# Patient Record
Sex: MALE | Race: WHITE | URBAN - METROPOLITAN AREA
[De-identification: names, ages, dates, MRNs, and addresses within clinical notes are randomized per-mention and may not be internally consistent; named-entity substitution may affect disease eponyms.]

---

## 2018-06-20 ENCOUNTER — OFFICE VISIT (OUTPATIENT)
Dept: ENT CLINIC | Age: 73
End: 2018-06-20

## 2018-06-20 VITALS — WEIGHT: 179 LBS | DIASTOLIC BLOOD PRESSURE: 65 MMHG | SYSTOLIC BLOOD PRESSURE: 146 MMHG | HEART RATE: 81 BPM

## 2018-06-20 DIAGNOSIS — H91.93 BILATERAL HEARING LOSS, UNSPECIFIED HEARING LOSS TYPE: Primary | ICD-10-CM

## 2018-06-20 PROCEDURE — 69210 REMOVE IMPACTED EAR WAX UNI: CPT | Performed by: OTOLARYNGOLOGY

## 2018-06-20 PROCEDURE — 99203 OFFICE O/P NEW LOW 30 MIN: CPT | Performed by: OTOLARYNGOLOGY

## 2018-06-20 RX ORDER — TAMSULOSIN HYDROCHLORIDE 0.4 MG/1
CAPSULE ORAL
COMMUNITY
Start: 2018-03-31 | End: 2018-08-06 | Stop reason: SDUPTHER

## 2018-06-20 RX ORDER — PRAVASTATIN SODIUM 40 MG
TABLET ORAL
COMMUNITY
Start: 2018-05-23 | End: 2018-08-06 | Stop reason: SDUPTHER

## 2018-06-20 RX ORDER — LISINOPRIL 10 MG/1
TABLET ORAL
COMMUNITY
Start: 2018-05-26 | End: 2018-08-06 | Stop reason: SDUPTHER

## 2018-08-06 ENCOUNTER — OFFICE VISIT (OUTPATIENT)
Dept: ENT CLINIC | Age: 73
End: 2018-08-06

## 2018-08-06 VITALS
WEIGHT: 171 LBS | SYSTOLIC BLOOD PRESSURE: 118 MMHG | BODY MASS INDEX: 27.48 KG/M2 | HEART RATE: 69 BPM | HEIGHT: 66 IN | DIASTOLIC BLOOD PRESSURE: 69 MMHG

## 2018-08-06 DIAGNOSIS — H90.3 BILATERAL HIGH FREQUENCY SENSORINEURAL HEARING LOSS: Primary | ICD-10-CM

## 2018-08-06 PROCEDURE — 99214 OFFICE O/P EST MOD 30 MIN: CPT | Performed by: OTOLARYNGOLOGY

## 2018-08-06 RX ORDER — LORATADINE AND PSEUDOEPHEDRINE 10; 240 MG/1; MG/1
1 TABLET, EXTENDED RELEASE ORAL
COMMUNITY
Start: 2018-06-12

## 2018-08-06 RX ORDER — TAMSULOSIN HYDROCHLORIDE 0.4 MG/1
0.4 CAPSULE ORAL
COMMUNITY
Start: 2017-11-27

## 2018-08-06 RX ORDER — LISINOPRIL 10 MG/1
10 TABLET ORAL
COMMUNITY
Start: 2017-11-27

## 2018-08-06 RX ORDER — PRAVASTATIN SODIUM 40 MG
TABLET ORAL
COMMUNITY
Start: 2018-05-22

## 2018-08-06 NOTE — PROGRESS NOTES
[address] [place] [address] ENT       CHIEF COMPLAINT:  See chief complaint/reason for visit. INTERVAL HISTORY:        No new problems or data. Patient states she has a particular difficulty understanding his wife and other female voices. He denied tinnitus and dizziness. AUDIOGRAM (7/10/2018): Bilateral mild to moderate to severe symmetrical, high frequency sensorineural hearing loss in a noise induced hearing loss pattern. Speech reception threshold and word recognition scores were normal bilaterally. There was reduced word recognition to 40% in the presence of background noise. Tympanograms are normal bilaterally. Acoustic reflexes were normal bilaterally. (see report for details)      COUNSELING:    Audiogram results were reviewed and discussed with Gingerelizabeth Harifloridalma. I advised of type of hearing loss and the probable etiology. I discussed the possible associated impairment and disability. I advised of the need for noise protection and avoidance of exposure to excessive noise. I discussed the possible benefits of amplification, hearing aids. I discussed the possible etiology of tinnitus and treatment/coping measures including masking techniques. I advised of the need for annual and prn hearing tests. Patient was advised of the greater decrease in word recognition ability in the presence of background noise and of the expected difficulty understanding speech in the presence of moderate to high volume background noise associated with this hearing loss. IMPRESSION / Ez Grief / Mary Slain / PROCEDURES:       Alanna Lazo was seen today for hearing loss. Diagnoses and all orders for this visit:    Bilateral high frequency sensorineural hearing loss         RECOMMENDATIONS / PLAN:    1. See Patient Instructions. 2. Return for Annual hearing test, or any further Ear Nose Throat or Sinus problems.        TIME:  I spent a total of 25 minutes with this patient; counseling time = 18

## 2018-08-06 NOTE — PATIENT INSTRUCTIONS
· You should consider amplification therapy, hearing aid, if, and when, you are having difficulty communicating and/or hearing important sounds. · You should return if your ears plug up with ear wax causing difficulty hearing or pressure. · You should return for an annual hearing test to monitor your hearing, and whenever your hearing further decreases significantly. · You should avoid exposure to excessively high levels of noise/sound and use hearing protection measures we discussed, as needed, if such exposure is unavoidable. · NO Q-TIPS IN THE EARS  You should never clean your ears with a Q-tip, cotton tipped applicator, Kristan pin, paper clip, Peewee head screw , or any other instrument. I recommend only use of one the several ear wax removal kits available \"over the counter\" (eg. [employer] Lomb or Murine, or The [employer]) if you feel a need to try to remove ear wax. No other methods should be self used for this purpose as there is danger of injury to the ear and risk of irreparable and irreversible permanent hearing loss. · You may use an over the counter ear wax removal kit (such as Murine, B[employer]ch and Lomb, [employer]Med, or Debrox wax removal system) for ear wax removal, as needed. · It may help to use Debrox (OTC) for 4 days prior to future visits for ear cleaning. This may soften your ear wax and facilitate removal of the wax.